# Patient Record
Sex: FEMALE | Race: WHITE | Employment: UNEMPLOYED | URBAN - METROPOLITAN AREA
[De-identification: names, ages, dates, MRNs, and addresses within clinical notes are randomized per-mention and may not be internally consistent; named-entity substitution may affect disease eponyms.]

---

## 2017-04-10 PROBLEM — E11.9 TYPE 2 DIABETES MELLITUS WITHOUT COMPLICATION, WITHOUT LONG-TERM CURRENT USE OF INSULIN (HCC): Status: ACTIVE | Noted: 2017-04-10

## 2017-04-11 PROCEDURE — 82043 UR ALBUMIN QUANTITATIVE: CPT | Performed by: FAMILY MEDICINE

## 2017-04-11 PROCEDURE — 82570 ASSAY OF URINE CREATININE: CPT | Performed by: FAMILY MEDICINE

## 2018-01-09 ENCOUNTER — LAB ENCOUNTER (OUTPATIENT)
Dept: LAB | Age: 58
End: 2018-01-09
Attending: FAMILY MEDICINE
Payer: COMMERCIAL

## 2018-01-09 ENCOUNTER — OFFICE VISIT (OUTPATIENT)
Dept: FAMILY MEDICINE CLINIC | Facility: CLINIC | Age: 58
End: 2018-01-09

## 2018-01-09 VITALS
WEIGHT: 149 LBS | HEIGHT: 66 IN | BODY MASS INDEX: 23.95 KG/M2 | SYSTOLIC BLOOD PRESSURE: 118 MMHG | OXYGEN SATURATION: 97 % | TEMPERATURE: 98 F | HEART RATE: 86 BPM | DIASTOLIC BLOOD PRESSURE: 66 MMHG

## 2018-01-09 DIAGNOSIS — E11.9 TYPE 2 DIABETES MELLITUS WITHOUT COMPLICATION, WITHOUT LONG-TERM CURRENT USE OF INSULIN (HCC): ICD-10-CM

## 2018-01-09 DIAGNOSIS — R53.83 FATIGUE, UNSPECIFIED TYPE: ICD-10-CM

## 2018-01-09 DIAGNOSIS — E03.9 HYPOTHYROIDISM, UNSPECIFIED TYPE: Primary | ICD-10-CM

## 2018-01-09 DIAGNOSIS — E03.9 HYPOTHYROIDISM, UNSPECIFIED TYPE: ICD-10-CM

## 2018-01-09 DIAGNOSIS — E55.9 VITAMIN D DEFICIENCY: ICD-10-CM

## 2018-01-09 DIAGNOSIS — Z23 FLU VACCINE NEED: ICD-10-CM

## 2018-01-09 DIAGNOSIS — E78.2 MIXED HYPERLIPIDEMIA: ICD-10-CM

## 2018-01-09 PROBLEM — R73.03 PRE-DIABETES: Status: ACTIVE | Noted: 2018-01-09

## 2018-01-09 LAB
BASOPHILS # BLD: 0.1 K/UL (ref 0–0.2)
BASOPHILS NFR BLD: 1 %
EOSINOPHIL # BLD: 0 K/UL (ref 0–0.7)
EOSINOPHIL NFR BLD: 1 %
ERYTHROCYTE [DISTWIDTH] IN BLOOD BY AUTOMATED COUNT: 12.4 % (ref 11–15)
HCT VFR BLD AUTO: 39 % (ref 35–48)
HGB BLD-MCNC: 13.4 G/DL (ref 12–16)
LYMPHOCYTES # BLD: 1.8 K/UL (ref 1–4)
LYMPHOCYTES NFR BLD: 28 %
MCH RBC QN AUTO: 31.7 PG (ref 27–32)
MCHC RBC AUTO-ENTMCNC: 34.3 G/DL (ref 32–37)
MCV RBC AUTO: 92.5 FL (ref 80–100)
MONOCYTES # BLD: 0.5 K/UL (ref 0–1)
MONOCYTES NFR BLD: 7 %
NEUTROPHILS # BLD AUTO: 4.1 K/UL (ref 1.8–7.7)
NEUTROPHILS NFR BLD: 64 %
PLATELET # BLD AUTO: 322 K/UL (ref 140–400)
PMV BLD AUTO: 9.6 FL (ref 7.4–10.3)
RBC # BLD AUTO: 4.22 M/UL (ref 3.7–5.4)
T3FREE SERPL-MCNC: 3.26 PG/ML (ref 2.53–4.29)
T4 FREE SERPL-MCNC: 1.09 NG/DL (ref 0.58–1.64)
T4 SERPL-MCNC: 9.7 MCG/DL (ref 6.1–12.2)
TSH SERPL-ACNC: 1.19 UIU/ML (ref 0.45–5.33)
WBC # BLD AUTO: 6.5 K/UL (ref 4–11)

## 2018-01-09 PROCEDURE — 85025 COMPLETE CBC W/AUTO DIFF WBC: CPT

## 2018-01-09 PROCEDURE — 99204 OFFICE O/P NEW MOD 45 MIN: CPT | Performed by: FAMILY MEDICINE

## 2018-01-09 PROCEDURE — 84481 FREE ASSAY (FT-3): CPT

## 2018-01-09 PROCEDURE — 36415 COLL VENOUS BLD VENIPUNCTURE: CPT

## 2018-01-09 PROCEDURE — 83036 HEMOGLOBIN GLYCOSYLATED A1C: CPT

## 2018-01-09 PROCEDURE — 82306 VITAMIN D 25 HYDROXY: CPT

## 2018-01-09 PROCEDURE — 84443 ASSAY THYROID STIM HORMONE: CPT

## 2018-01-09 PROCEDURE — 84439 ASSAY OF FREE THYROXINE: CPT

## 2018-01-10 LAB
25(OH)D3 SERPL-MCNC: 48.2 NG/ML
HBA1C MFR BLD: 5.8 % (ref 4–6)

## 2018-01-10 NOTE — PROGRESS NOTES
HPI:   Patient presents with:  Diabetes  Thyroid Problem  Medication Follow-Up  Fatigue      Preet Smith is a 59year old female with a history of hypothyroidism and is here for a follow up.      · Patient is currently taking levothyroxine 75 mcg on 2013: HgA1C 5.7%   • Hypothyroidism     Rediagnosed in 2013   • Murmur    • Plantar fasciitis    • PONV (postoperative nausea and vomiting)    • Subacute thyroiditis     Dx in 2009 - due to viral illness   • Vitamin D deficiency 10/1/2014    Dx in 9/2014 S2, no S3 S4  EXT: no CCE, Brachial, PT pulses WNL B UE/LE  GI: NABS, soft, nodistended,no masses, no HSM or tenderness  MS: grossly NL B UE/LE, no joint deformities, FROM B UE/LE  PSYCH: mood and affect WNL, speech clear, mood and thought congruent, no SH 1000 units once a day, will continue present management, check vitamin D levels now, follow-up pending results  - VITAMIN D, 25-HYDROXY; Future    6.  Flu vaccine need  Pt declined      The patient indicates understanding of the above recommendations and ag

## 2018-01-11 DIAGNOSIS — E11.9 TYPE 2 DIABETES MELLITUS WITHOUT COMPLICATION, WITHOUT LONG-TERM CURRENT USE OF INSULIN (HCC): ICD-10-CM

## 2018-01-11 DIAGNOSIS — E03.9 HYPOTHYROIDISM, UNSPECIFIED TYPE: Primary | ICD-10-CM

## 2018-01-12 RX ORDER — LEVOTHYROXINE SODIUM 0.1 MG/1
TABLET ORAL
Qty: 30 TABLET | Refills: 1 | Status: SHIPPED | OUTPATIENT
Start: 2018-01-12 | End: 2018-02-25

## 2018-01-24 LAB
AMB EXT CHOLESTEROL, TOTAL: 215 MG/DL
AMB EXT CHOLESTEROL, TOTAL: 215 MG/DL
AMB EXT HDL CHOLESTEROL: 62 MG/DL
AMB EXT HDL CHOLESTEROL: 62 MG/DL
AMB EXT LDL CHOLESTEROL, DIRECT: 132 MG/DL
AMB EXT LDL CHOLESTEROL, DIRECT: 132 MG/DL
AMB EXT TRIGLYCERIDES: 104 MG/DL
AMB EXT TRIGLYCERIDES: 104 MG/DL
AMB EXT VLDL: 21 MG/DL
AMB EXT VLDL: 21 MG/DL

## 2018-01-29 ENCOUNTER — TELEPHONE (OUTPATIENT)
Dept: FAMILY MEDICINE CLINIC | Facility: CLINIC | Age: 58
End: 2018-01-29

## 2018-01-29 DIAGNOSIS — E11.9 TYPE 2 DIABETES MELLITUS WITHOUT COMPLICATION, WITHOUT LONG-TERM CURRENT USE OF INSULIN (HCC): Primary | ICD-10-CM

## 2018-01-29 DIAGNOSIS — E78.2 MIXED HYPERLIPIDEMIA: ICD-10-CM

## 2018-02-26 RX ORDER — LEVOTHYROXINE SODIUM 0.1 MG/1
TABLET ORAL
Qty: 30 TABLET | Refills: 1 | Status: SHIPPED | OUTPATIENT
Start: 2018-02-26 | End: 2018-03-21

## 2018-03-02 ENCOUNTER — TELEPHONE (OUTPATIENT)
Dept: FAMILY MEDICINE CLINIC | Facility: CLINIC | Age: 58
End: 2018-03-02

## 2018-03-02 NOTE — TELEPHONE ENCOUNTER
Pt called very upset about her hospital bill. She wants  to know that she is getting two bills for the same radiologists Janet Turner for her blood test here on 1st floor.   Also she said the [facility] send results to us on Jan 24 2017 and she

## 2018-03-21 ENCOUNTER — TELEPHONE (OUTPATIENT)
Dept: FAMILY MEDICINE CLINIC | Facility: CLINIC | Age: 58
End: 2018-03-21

## 2018-03-21 DIAGNOSIS — E11.9 TYPE 2 DIABETES MELLITUS WITHOUT COMPLICATION, WITHOUT LONG-TERM CURRENT USE OF INSULIN (HCC): ICD-10-CM

## 2018-03-21 RX ORDER — LEVOTHYROXINE SODIUM 0.1 MG/1
100 TABLET ORAL
Qty: 90 TABLET | Refills: 0 | Status: SHIPPED | OUTPATIENT
Start: 2018-03-21 | End: 2018-06-20

## 2018-04-05 ENCOUNTER — TELEPHONE (OUTPATIENT)
Dept: FAMILY MEDICINE CLINIC | Facility: CLINIC | Age: 58
End: 2018-04-05

## 2018-04-05 DIAGNOSIS — R93.1 ABNORMAL HEART SCORE CT: Primary | ICD-10-CM

## 2018-04-05 NOTE — TELEPHONE ENCOUNTER
Dr jha a CT HEART CALCIUM SCORING order by Dr. Corby Cesar and he's referring pt to see a cardiologist for further evaluation. Do you want to see this pt for appt? Please advice.

## 2018-04-05 NOTE — TELEPHONE ENCOUNTER
Pt was concerned heart ct scan she saw on my chart wanted to talk to Dr. Abdul Cashing her she is not hear today,she would like a cb from nurse.

## 2018-04-10 NOTE — TELEPHONE ENCOUNTER
Per Dr. Frances Montes De Oca:  Call pt-her Ca+ score places her at mod risk for heart dis-agree w Cardiology evaluation, but no need to be worried. .. (Routing comment)     Spoke to pt, informed her of CT heart calcium scoring results.  Pt states she did see the re

## 2018-04-12 ENCOUNTER — CHARTING TRANS (OUTPATIENT)
Dept: OTHER | Age: 58
End: 2018-04-12

## 2018-04-12 ENCOUNTER — TELEPHONE (OUTPATIENT)
Dept: FAMILY MEDICINE CLINIC | Facility: CLINIC | Age: 58
End: 2018-04-12

## 2018-04-12 PROBLEM — R93.1 ABNORMAL HEART SCORE CT: Status: ACTIVE | Noted: 2018-04-12

## 2018-04-12 NOTE — TELEPHONE ENCOUNTER
Shirley Duffy from Dr. Gwen Hubbard office called for records for pt. [id number] [facility] nurse faxed Cardiac Calcium Score progress notes and blood results to office at [phone]. Pt had an appt today.

## 2018-04-20 ENCOUNTER — CHARTING TRANS (OUTPATIENT)
Dept: OTHER | Age: 58
End: 2018-04-20

## 2018-04-20 ENCOUNTER — DIAGNOSTIC TRANS (OUTPATIENT)
Dept: OTHER | Age: 58
End: 2018-04-20

## 2018-06-19 DIAGNOSIS — E03.8 OTHER SPECIFIED HYPOTHYROIDISM: Primary | ICD-10-CM

## 2018-06-19 DIAGNOSIS — E11.9 TYPE 2 DIABETES MELLITUS WITHOUT COMPLICATION, WITHOUT LONG-TERM CURRENT USE OF INSULIN (HCC): ICD-10-CM

## 2018-06-19 NOTE — TELEPHONE ENCOUNTER
A refill request was received for:    Pending Prescriptions Disp Refills    METFORMIN  MG Oral Tab [Pharmacy Med Name: METFORMIN  MG TABLET] 180 tablet 0     Sig: TAKE 1 TABLET BY MOUTH RIGHT AFTER BREAKFAST AND DINNER         Last refill jackie

## 2018-06-20 RX ORDER — LEVOTHYROXINE SODIUM 0.1 MG/1
100 TABLET ORAL
Qty: 90 TABLET | Refills: 0 | Status: SHIPPED | OUTPATIENT
Start: 2018-06-20 | End: 2018-08-24

## 2018-06-21 NOTE — TELEPHONE ENCOUNTER
Pt would like to know if she needs labs prior to 7/31/18 appt. Last labs (CMP/Lipids) 1/24/18; scanned in. Full labs including HgbA1c, TFT, CBC, Vit D completed 1/9/18.     RN/MA: Please print lab orders, pt requests to have them completed elsewhere due

## 2018-06-21 NOTE — TELEPHONE ENCOUNTER
Cld pt made appt for 7/31. Please refill metaformin. She wants to know if doc wants blood test run again before she sees her, they were run in Jan. Also she saw a cardiologist out of [facility] who started her on a statin a month ago.   If blood work needed

## 2018-07-03 ENCOUNTER — TELEPHONE (OUTPATIENT)
Dept: SURGERY | Facility: CLINIC | Age: 58
End: 2018-07-03

## 2018-07-03 NOTE — TELEPHONE ENCOUNTER
Pt phoned office wanting to speak to Dr. Rubina Bah regarding test result. States Radiologist documented 'appendix' in her chart, and pt states it was removed. Dr. Rubina Bah aware, and instructed pt that he was going to review the imaging.  Dr. Rubina Bah notified of

## 2018-08-01 LAB
ALBUMIN/GLOBULIN RATIO: 1.4 (CALC) (ref 1–2.5)
ALBUMIN: 4.3 G/DL (ref 3.6–5.1)
ALKALINE PHOSPHATASE: 69 U/L (ref 33–130)
ALT: 15 U/L (ref 6–29)
AMB EXT CHOLESTEROL, TOTAL: 130 MG/DL
AMB EXT HDL CHOLESTEROL: 61 MG/DL
AMB EXT LDL CHOLESTEROL, DIRECT: 52 MG/DL
AMB EXT TRIGLYCERIDES: 84 MG/DL
AMB EXT VLDL: 17 MG/DL
AST: 21 U/L (ref 10–35)
BILIRUBIN, TOTAL: 0.6 MG/DL (ref 0.2–1.2)
BUN: 14 MG/DL (ref 7–25)
CALCIUM: 9.4 MG/DL (ref 8.6–10.4)
CARBON DIOXIDE: 28 MMOL/L (ref 20–31)
CHLORIDE: 105 MMOL/L (ref 98–110)
CREATININE: 0.79 MG/DL (ref 0.5–1.05)
EGFR IF AFRICN AM: 95 ML/MIN/1.73M2
EGFR IF NONAFRICN AM: 82 ML/MIN/1.73M2
GLOBULIN: 3 G/DL (CALC) (ref 1.9–3.7)
GLUCOSE: 100 MG/DL (ref 65–99)
HEMOGLOBIN A1C: 5.6 % OF TOTAL HGB
POTASSIUM: 4.3 MMOL/L (ref 3.5–5.3)
PROTEIN, TOTAL: 7.3 G/DL (ref 6.1–8.1)
SODIUM: 140 MMOL/L (ref 135–146)
T4 (THYROXINE), TOTAL: 10.5 MCG/DL (ref 4.5–12)
T4, FREE: 1.6 NG/DL (ref 0.8–1.8)
TSH: 0.21 MIU/L (ref 0.4–4.5)

## 2018-08-24 ENCOUNTER — OFFICE VISIT (OUTPATIENT)
Dept: FAMILY MEDICINE CLINIC | Facility: CLINIC | Age: 58
End: 2018-08-24
Payer: COMMERCIAL

## 2018-08-24 VITALS
WEIGHT: 152 LBS | RESPIRATION RATE: 16 BRPM | HEIGHT: 66 IN | OXYGEN SATURATION: 98 % | HEART RATE: 78 BPM | BODY MASS INDEX: 24.43 KG/M2 | SYSTOLIC BLOOD PRESSURE: 122 MMHG | DIASTOLIC BLOOD PRESSURE: 72 MMHG

## 2018-08-24 DIAGNOSIS — E78.00 PURE HYPERCHOLESTEROLEMIA: ICD-10-CM

## 2018-08-24 DIAGNOSIS — Z12.39 SCREENING FOR BREAST CANCER: ICD-10-CM

## 2018-08-24 DIAGNOSIS — I25.84 CORONARY ARTERY CALCIFICATION: ICD-10-CM

## 2018-08-24 DIAGNOSIS — I25.10 CORONARY ARTERY CALCIFICATION: ICD-10-CM

## 2018-08-24 DIAGNOSIS — R73.03 PRE-DIABETES: ICD-10-CM

## 2018-08-24 DIAGNOSIS — E03.8 OTHER SPECIFIED HYPOTHYROIDISM: Primary | ICD-10-CM

## 2018-08-24 PROBLEM — E11.9 TYPE 2 DIABETES MELLITUS WITHOUT COMPLICATION, WITHOUT LONG-TERM CURRENT USE OF INSULIN (HCC): Status: RESOLVED | Noted: 2017-04-10 | Resolved: 2018-08-24

## 2018-08-24 PROCEDURE — 99214 OFFICE O/P EST MOD 30 MIN: CPT | Performed by: FAMILY MEDICINE

## 2018-08-24 RX ORDER — LEVOTHYROXINE SODIUM 0.1 MG/1
100 TABLET ORAL
Qty: 90 TABLET | Refills: 0 | Status: SHIPPED | OUTPATIENT
Start: 2018-08-24 | End: 2018-12-16

## 2018-08-24 RX ORDER — ROSUVASTATIN CALCIUM 5 MG/1
5 TABLET, COATED ORAL DAILY
Qty: 90 TABLET | Refills: 0 | Status: SHIPPED | OUTPATIENT
Start: 2018-08-24 | End: 2018-11-22

## 2018-08-24 NOTE — PROGRESS NOTES
HPI:   Patient presents with:  Diabetes: pre-diabetes  Thyroid Problem  Hyperlipidemia      Claire Chan is a 59year old female with a history of hypothyroidism and is here for a follow up. Patient is currently taking: see below.   Pt has been t tablet Rfl: 0   Levothyroxine Sodium 100 MCG Oral Tab Take 1 tablet (100 mcg total) by mouth once daily. Disp: 90 tablet Rfl: 0   Cholecalciferol (VITAMIN D) 1000 UNITS Oral Tab Take 1 tablet by mouth daily.  Disp:  Rfl:    Multiple Vitamins-Minerals (MULTI lesions, skin color wnl  HEENT: atraumatic, normocephalic, nose and throat are clear;dentition good, EARS: canals clear, TM wnl B  EYES:PERRLA, EOMI,conjunctiva are clear, no discharge; no nystagmus  NECK: supple, no lymphadenopathy, THYROID: no TM, no mas months  We discussed the risks and benefits of her medication. The patient (and/or her parent) aware of the risks of taking the above medication and indicates understanding of these risks and agrees to the above plan.   - MetFORMIN HCl 500 MG Oral Tab; TAKE

## 2018-10-09 ENCOUNTER — OFFICE VISIT (OUTPATIENT)
Dept: SURGERY | Facility: CLINIC | Age: 58
End: 2018-10-09
Payer: COMMERCIAL

## 2018-10-09 VITALS
SYSTOLIC BLOOD PRESSURE: 132 MMHG | WEIGHT: 149 LBS | HEART RATE: 80 BPM | TEMPERATURE: 98 F | HEIGHT: 66 IN | BODY MASS INDEX: 23.95 KG/M2 | DIASTOLIC BLOOD PRESSURE: 77 MMHG

## 2018-10-09 DIAGNOSIS — Z83.71 FAMILY HISTORY OF POLYPS IN THE COLON: Primary | ICD-10-CM

## 2018-10-09 DIAGNOSIS — Z83.71 FAMILY HISTORY OF COLONIC POLYPS: ICD-10-CM

## 2018-10-09 DIAGNOSIS — E03.8 OTHER SPECIFIED HYPOTHYROIDISM: ICD-10-CM

## 2018-10-09 PROBLEM — Z83.719 FAMILY HISTORY OF COLONIC POLYPS: Status: ACTIVE | Noted: 2018-10-09

## 2018-10-09 PROCEDURE — 99203 OFFICE O/P NEW LOW 30 MIN: CPT | Performed by: SURGERY

## 2018-10-09 RX ORDER — POLYETHYLENE GLYCOL 3350, SODIUM CHLORIDE, SODIUM BICARBONATE, POTASSIUM CHLORIDE 420; 11.2; 5.72; 1.48 G/4L; G/4L; G/4L; G/4L
POWDER, FOR SOLUTION ORAL
Qty: 1 BOTTLE | Refills: 0 | Status: SHIPPED | OUTPATIENT
Start: 2018-10-09 | End: 2018-11-19 | Stop reason: ALTCHOICE

## 2018-10-09 RX ORDER — POLYETHYLENE GLYCOL 3350, SODIUM CHLORIDE, SODIUM BICARBONATE, POTASSIUM CHLORIDE 420; 11.2; 5.72; 1.48 G/4L; G/4L; G/4L; G/4L
POWDER, FOR SOLUTION ORAL
Qty: 1 BOTTLE | Refills: 0 | Status: CANCELLED | OUTPATIENT
Start: 2018-10-09

## 2018-10-09 NOTE — PROGRESS NOTES
Follow Up Visit Note       Active Problems      1. Family history of polyps in the colon    2. Family history of colonic polyps    3.  Other specified hypothyroidism          Chief Complaint   Patient presents with:  Colonoscopy: last csope 12/12/13 with RR Marital status:       Spouse name: Not on file      Number of children: 2      Years of education: Not on file      Highest education level: Not on file    Social Needs      Financial resource strain: Not on file      Food insecurity - worry: Not on been reviewed by me during today. Review of Systems   Constitutional: Negative for chills, diaphoresis, fatigue, fever and unexpected weight change. HENT: Negative for hearing loss, nosebleeds, sore throat and trouble swallowing.     Respiratory: Negativ benefits, alternatives. · Risks include but are not exclusive to infection, bleeding, perforation, and missed lesion. · Bowel prep discussed with the patient and printed instructions provided. · All questions answered.       I spent 30 minutes face to fa

## 2018-11-01 VITALS
DIASTOLIC BLOOD PRESSURE: 78 MMHG | SYSTOLIC BLOOD PRESSURE: 122 MMHG | OXYGEN SATURATION: 99 % | HEIGHT: 66 IN | HEART RATE: 88 BPM | BODY MASS INDEX: 23.63 KG/M2 | WEIGHT: 147 LBS | RESPIRATION RATE: 16 BRPM

## 2018-11-01 VITALS — SYSTOLIC BLOOD PRESSURE: 122 MMHG | DIASTOLIC BLOOD PRESSURE: 64 MMHG | HEART RATE: 77 BPM

## 2018-11-15 ENCOUNTER — TELEPHONE (OUTPATIENT)
Dept: SURGERY | Facility: CLINIC | Age: 58
End: 2018-11-15

## 2018-11-22 DIAGNOSIS — E78.00 PURE HYPERCHOLESTEROLEMIA: ICD-10-CM

## 2018-11-25 RX ORDER — ROSUVASTATIN CALCIUM 5 MG/1
TABLET, COATED ORAL
Qty: 90 TABLET | Refills: 0 | Status: SHIPPED | OUTPATIENT
Start: 2018-11-25 | End: 2019-02-22

## 2018-11-27 ENCOUNTER — ANESTHESIA EVENT (OUTPATIENT)
Dept: ENDOSCOPY | Facility: HOSPITAL | Age: 58
End: 2018-11-27

## 2018-11-27 NOTE — H&P
History of Present Illness     Pt here for a colonoscopy. Last one in 12/2013 - normal.  Pt's sister with a large amount of colon polyps by colonoscopy. Pt's brother had multiple polyps as well as needed surgery to remove a large polyp (colon resection). Financial resource strain: Not on file      Food insecurity - worry: Not on file      Food insecurity - inability: Not on file      Transportation needs - medical: Not on file      Transportation needs - non-medical: Not on file    Occupational History hearing loss, nosebleeds, sore throat and trouble swallowing. Respiratory: Negative for apnea, cough, shortness of breath and wheezing. Cardiovascular: Negative for chest pain, palpitations and leg swelling.    Gastrointestinal: Negative for abdominal instructions provided.   · All questions answered.            Pre-op Diagnosis: Family history of polyps in the colon [Z83.71]    The above referenced H&P was reviewed by Jacek Lake MD on 11/27/2018, the patient was examined and no significant changes h

## 2018-11-28 ENCOUNTER — HOSPITAL ENCOUNTER (OUTPATIENT)
Facility: HOSPITAL | Age: 58
Setting detail: HOSPITAL OUTPATIENT SURGERY
Discharge: HOME | End: 2018-11-28
Attending: SURGERY | Admitting: SURGERY
Payer: COMMERCIAL

## 2018-11-28 ENCOUNTER — ANESTHESIA (OUTPATIENT)
Dept: ENDOSCOPY | Facility: HOSPITAL | Age: 58
End: 2018-11-28

## 2018-11-28 VITALS
TEMPERATURE: 98 F | DIASTOLIC BLOOD PRESSURE: 84 MMHG | SYSTOLIC BLOOD PRESSURE: 132 MMHG | BODY MASS INDEX: 23.78 KG/M2 | RESPIRATION RATE: 16 BRPM | HEART RATE: 64 BPM | WEIGHT: 148 LBS | HEIGHT: 66 IN | OXYGEN SATURATION: 99 %

## 2018-11-28 DIAGNOSIS — Z83.71 FAMILY HISTORY OF POLYPS IN THE COLON: ICD-10-CM

## 2018-11-28 PROBLEM — K64.8 INTERNAL HEMORRHOIDS WITHOUT COMPLICATION: Status: ACTIVE | Noted: 2018-11-28

## 2018-11-28 PROCEDURE — 0DJD8ZZ INSPECTION OF LOWER INTESTINAL TRACT, VIA NATURAL OR ARTIFICIAL OPENING ENDOSCOPIC: ICD-10-PCS | Performed by: SURGERY

## 2018-11-28 PROCEDURE — 82962 GLUCOSE BLOOD TEST: CPT

## 2018-11-28 RX ORDER — DEXTROSE MONOHYDRATE 25 G/50ML
50 INJECTION, SOLUTION INTRAVENOUS
Status: DISCONTINUED | OUTPATIENT
Start: 2018-11-28 | End: 2018-11-28

## 2018-11-28 RX ORDER — SODIUM CHLORIDE, SODIUM LACTATE, POTASSIUM CHLORIDE, CALCIUM CHLORIDE 600; 310; 30; 20 MG/100ML; MG/100ML; MG/100ML; MG/100ML
INJECTION, SOLUTION INTRAVENOUS CONTINUOUS
Status: DISCONTINUED | OUTPATIENT
Start: 2018-11-28 | End: 2018-11-28

## 2018-11-28 NOTE — BRIEF OP NOTE
Pre-Operative Diagnosis: Family history of polyps in the colon [Z83.71]     Post-Operative Diagnosis: HEMORRHOIDS      Procedure Performed:   Procedure(s):  COLONOSCOPY    Surgeon(s) and Role:     Selina Godoy MD - Primary    Assistant(s):        Surg

## 2018-11-28 NOTE — OPERATIVE REPORT
[facility]    PATIENT'S NAME: Ross Burn [facility]   ATTENDING PHYSICIAN: Catalino Connor M.D. OPERATING PHYSICIAN: Catalino Connor M.D.    PATIENT ACCOUNT#:   [de-identified]    LOCATION:  [facility] ROOMS 8 EDWP  MEDICAL RECORD #:   [id number] removed. The patient tolerated the procedure well and was taken to Recovery in stable condition. The patient will need a repeat colonoscopy in 5 years given her significant family history of colon polyps.   The patient will follow up in 1 to 2 weeks t

## 2018-11-28 NOTE — ANESTHESIA POSTPROCEDURE EVALUATION
[id number] [facility] Patient Status:  Hospital Outpatient Surgery   Age/Gender 59year old female MRN [id number]   Location [facility]. Attending Landen Park MD   Hosp Day # 0 PCP MD Delmis Adams

## 2018-11-28 NOTE — ANESTHESIA PREPROCEDURE EVALUATION
PRE-OP EVALUATION    Patient Name: Geeta Boo    Pre-op Diagnosis: Family history of polyps in the colon [Z83.71]    Procedure(s):  COLONOSCOPY    Surgeon(s) and Role:     Venice Stevens MD - Primary    Pre-op vitals reviewed.   Temp: 97.7 °F (3 from right Achilles/debridement   • OTHER SURGICAL HISTORY      Kennan teeth surgery     Social History    Tobacco Use      Smoking status: Never Smoker      Smokeless tobacco: Never Used    Alcohol use: Yes      Comment: SOCIAL      Drug use: No     Avail

## 2018-12-16 DIAGNOSIS — E03.8 OTHER SPECIFIED HYPOTHYROIDISM: ICD-10-CM

## 2018-12-16 DIAGNOSIS — R73.03 PRE-DIABETES: ICD-10-CM

## 2018-12-17 RX ORDER — LEVOTHYROXINE SODIUM 0.1 MG/1
TABLET ORAL
Qty: 90 TABLET | Refills: 0 | Status: SHIPPED | OUTPATIENT
Start: 2018-12-17 | End: 2019-03-16

## 2018-12-17 NOTE — TELEPHONE ENCOUNTER
A refill request was received for:  Requested Prescriptions     Pending Prescriptions Disp Refills   • LEVOTHYROXINE SODIUM 100 MCG Oral Tab [Pharmacy Med Name: LEVOTHYROXINE 100 MCG TABLET] 90 tablet 0     Sig: TAKE 1 TABLET BY MOUTH ONCE DAILY.    • LLOYD

## 2019-02-02 LAB
ABSOLUTE BASOPHILS: 21 CELLS/UL (ref 0–200)
ABSOLUTE EOSINOPHILS: 57 CELLS/UL (ref 15–500)
ABSOLUTE LYMPHOCYTES: 1624 CELLS/UL (ref 850–3900)
ABSOLUTE MONOCYTES: 414 CELLS/UL (ref 200–950)
ABSOLUTE NEUTROPHILS: 1984 CELLS/UL (ref 1500–7800)
BASOPHILS: 0.5 %
CHOL/HDLC RATIO: 2 (CALC)
CHOLESTEROL, TOTAL: 129 MG/DL
EOSINOPHILS: 1.4 %
HDL CHOLESTEROL: 66 MG/DL
HEMATOCRIT: 39.3 % (ref 35–45)
HEMOGLOBIN A1C: 5.6 % OF TOTAL HGB
HEMOGLOBIN: 13.2 G/DL (ref 11.7–15.5)
LDL-CHOLESTEROL: 49 MG/DL (CALC)
LYMPHOCYTES: 39.6 %
MCH: 30.4 PG (ref 27–33)
MCHC: 33.6 G/DL (ref 32–36)
MCV: 90.6 FL (ref 80–100)
MONOCYTES: 10.1 %
MPV: 11.7 FL (ref 7.5–12.5)
NEUTROPHILS: 48.4 %
NON-HDL CHOLESTEROL: 63 MG/DL (CALC)
PLATELET COUNT: 272 THOUSAND/UL (ref 140–400)
RDW: 11.6 % (ref 11–15)
RED BLOOD CELL COUNT: 4.34 MILLION/UL (ref 3.8–5.1)
T4 (THYROXINE), TOTAL: 12.1 MCG/DL (ref 5.1–11.9)
T4, FREE: 1.8 NG/DL (ref 0.8–1.8)
TRIGLYCERIDES: 68 MG/DL
TSH: 0.18 MIU/L (ref 0.4–4.5)
WHITE BLOOD CELL COUNT: 4.1 THOUSAND/UL (ref 3.8–10.8)

## 2019-02-12 ENCOUNTER — OFFICE VISIT (OUTPATIENT)
Dept: FAMILY MEDICINE CLINIC | Facility: CLINIC | Age: 59
End: 2019-02-12
Payer: COMMERCIAL

## 2019-02-12 VITALS
WEIGHT: 153 LBS | HEIGHT: 66 IN | HEART RATE: 86 BPM | DIASTOLIC BLOOD PRESSURE: 70 MMHG | OXYGEN SATURATION: 98 % | SYSTOLIC BLOOD PRESSURE: 110 MMHG | RESPIRATION RATE: 17 BRPM | BODY MASS INDEX: 24.59 KG/M2

## 2019-02-12 DIAGNOSIS — E03.8 OTHER SPECIFIED HYPOTHYROIDISM: Primary | ICD-10-CM

## 2019-02-12 DIAGNOSIS — R73.03 PRE-DIABETES: ICD-10-CM

## 2019-02-12 DIAGNOSIS — Z00.00 ROUTINE MEDICAL EXAM: ICD-10-CM

## 2019-02-12 DIAGNOSIS — E78.00 PURE HYPERCHOLESTEROLEMIA: ICD-10-CM

## 2019-02-12 PROBLEM — K64.8 INTERNAL HEMORRHOIDS WITHOUT COMPLICATION: Status: RESOLVED | Noted: 2018-11-28 | Resolved: 2019-02-12

## 2019-02-12 LAB
CREAT UR-SCNC: 31.2 MG/DL
MICROALBUMIN UR-MCNC: 0.57 MG/DL
MICROALBUMIN/CREAT 24H UR-RTO: 18.3 UG/MG (ref ?–30)

## 2019-02-12 PROCEDURE — 82570 ASSAY OF URINE CREATININE: CPT | Performed by: FAMILY MEDICINE

## 2019-02-12 PROCEDURE — 82043 UR ALBUMIN QUANTITATIVE: CPT | Performed by: FAMILY MEDICINE

## 2019-02-12 PROCEDURE — 90471 IMMUNIZATION ADMIN: CPT | Performed by: FAMILY MEDICINE

## 2019-02-12 PROCEDURE — 99214 OFFICE O/P EST MOD 30 MIN: CPT | Performed by: FAMILY MEDICINE

## 2019-02-12 PROCEDURE — 90715 TDAP VACCINE 7 YRS/> IM: CPT | Performed by: FAMILY MEDICINE

## 2019-02-12 NOTE — PROGRESS NOTES
HPI:   Patient presents with:  Hyperlipidemia  Thyroid Problem  Pre-diabetes      Albaro Mayer is a 59year old female with a history of hypothyroidism and is here for a follow up. Patient is currently taking: see below.   Pt has been taking me A1c     Dx in 2013: HgA1C 5.7%   • High cholesterol    • Hypothyroidism     Rediagnosed in 2013   • Murmur    • Plantar fasciitis    • PONV (postoperative nausea and vomiting)    • Subacute thyroiditis     Dx in 2009 - due to viral illness   • Visual impai no S3 S4  EXT: no CCE, Brachial, PT pulses WNL B UE/LE  GI: NABS, soft, nodistended,no masses, no HSM or tenderness  MS: grossly NL B UE/LE, no joint deformities, FROM B UE/LE  PSYCH: mood and affect WNL, speech clear, mood and thought congruent, no SHIP recommendations and agrees to the above plan.   Follow up: as above    Orders Placed This Encounter      Microalb/Creat Ratio, Random Urine      CMP      LIPID PANEL      HgbA1c (Glycohemoglobin)      TSH      T4 free      T4 Total      Tdap      Meds & Ref

## 2019-02-22 DIAGNOSIS — E78.00 PURE HYPERCHOLESTEROLEMIA: ICD-10-CM

## 2019-02-22 RX ORDER — ROSUVASTATIN CALCIUM 5 MG/1
TABLET, COATED ORAL
Qty: 90 TABLET | Refills: 0 | Status: SHIPPED | OUTPATIENT
Start: 2019-02-22 | End: 2019-05-24

## 2019-03-16 DIAGNOSIS — E03.8 OTHER SPECIFIED HYPOTHYROIDISM: ICD-10-CM

## 2019-03-16 DIAGNOSIS — R73.03 PRE-DIABETES: ICD-10-CM

## 2019-03-19 RX ORDER — LEVOTHYROXINE SODIUM 0.1 MG/1
TABLET ORAL
Qty: 90 TABLET | Refills: 0 | Status: SHIPPED | OUTPATIENT
Start: 2019-03-19 | End: 2019-06-16

## 2019-04-16 ENCOUNTER — OFFICE VISIT (OUTPATIENT)
Dept: FAMILY MEDICINE CLINIC | Facility: CLINIC | Age: 59
End: 2019-04-16

## 2019-04-16 VITALS
HEIGHT: 66 IN | SYSTOLIC BLOOD PRESSURE: 122 MMHG | DIASTOLIC BLOOD PRESSURE: 62 MMHG | WEIGHT: 150 LBS | BODY MASS INDEX: 24.11 KG/M2

## 2019-04-16 DIAGNOSIS — R23.8 INTRINSIC AGING OF FACIAL SKIN: Primary | ICD-10-CM

## 2019-04-16 DIAGNOSIS — M79.10 MYALGIA: ICD-10-CM

## 2019-04-16 NOTE — PROCEDURES
HPI:   Patient presents with:  Derm Problem: Botox #1       Brad Emerson is a 59year old female who presents for cosmetic facial BOTOX injections for normal skin aging effects. She is without other complaints and concerns x for below.   See EDWIN dill weakness  PSYCH:  No mood concerns    EXAM:   /62 (BP Location: Right arm)   Ht 66\"   Wt 150 lb   LMP 07/15/2012   BMI 24.21 kg/m²  Estimated body mass index is 24.21 kg/m² as calculated from the following:    Height as of this encounter: 66\".     Derick Meth requested or ordered in this encounter       Imaging & Consults:  None    Elen Turner MD

## 2019-05-04 ENCOUNTER — TELEPHONE (OUTPATIENT)
Dept: FAMILY MEDICINE CLINIC | Facility: CLINIC | Age: 59
End: 2019-05-04

## 2019-05-05 NOTE — TELEPHONE ENCOUNTER
Rcvd pt's results for her additional mammographic views of L breast and subsequent L breast US. HM updated to indicate completion date. Report placed on provider's desk for review. Send to scan when complete.

## 2019-05-17 ENCOUNTER — TELEPHONE (OUTPATIENT)
Dept: FAMILY MEDICINE CLINIC | Facility: CLINIC | Age: 59
End: 2019-05-17

## 2019-05-17 NOTE — TELEPHONE ENCOUNTER
Rcvd screening mammogram results as well as results for pt's additional views and US of L breast. Reports placed on provider's desk. Send to scan when complete. HM updated to indicate results of additional views.

## 2019-05-24 DIAGNOSIS — E78.00 PURE HYPERCHOLESTEROLEMIA: ICD-10-CM

## 2019-05-24 RX ORDER — ROSUVASTATIN CALCIUM 5 MG/1
TABLET, COATED ORAL
Qty: 90 TABLET | Refills: 0 | Status: SHIPPED | OUTPATIENT
Start: 2019-05-24 | End: 2019-10-18

## 2019-06-16 DIAGNOSIS — E03.8 OTHER SPECIFIED HYPOTHYROIDISM: ICD-10-CM

## 2019-06-16 DIAGNOSIS — R73.03 PRE-DIABETES: ICD-10-CM

## 2019-06-17 NOTE — TELEPHONE ENCOUNTER
A refill request was received for:  Requested Prescriptions     Pending Prescriptions Disp Refills   • METFORMIN  MG Oral Tab [Pharmacy Med Name: METFORMIN  MG TABLET] 180 tablet 0     Sig: TAKE 1 TABLET BY MOUTH RIGHT AFTER BREAKFAST AND DIN

## 2019-06-18 RX ORDER — LEVOTHYROXINE SODIUM 0.1 MG/1
TABLET ORAL
Qty: 90 TABLET | Refills: 0 | Status: SHIPPED | OUTPATIENT
Start: 2019-06-18 | End: 2019-09-16

## 2019-06-18 NOTE — TELEPHONE ENCOUNTER
Call pt-LHK I sent in RF(s) and remind that she is due for labs and follow up with me: In August    (follow up should be seen Q6 months)  Thanks!

## 2019-08-19 NOTE — TELEPHONE ENCOUNTER
Pt cld to make appt for physical w/pap. She can only come in October because [place]. She also made appt for botox after physical based on Dr. Carlos Enamorado message to her.

## 2019-09-13 LAB
ALBUMIN/GLOBULIN RATIO: 1.5 (CALC) (ref 1–2.5)
ALBUMIN: 4.3 G/DL (ref 3.6–5.1)
ALKALINE PHOSPHATASE: 67 U/L (ref 33–130)
ALT: 14 U/L (ref 6–29)
AST: 21 U/L (ref 10–35)
BILIRUBIN, TOTAL: 0.5 MG/DL (ref 0.2–1.2)
BUN: 19 MG/DL (ref 7–25)
CALCIUM: 9.3 MG/DL (ref 8.6–10.4)
CARBON DIOXIDE: 28 MMOL/L (ref 20–32)
CHLORIDE: 103 MMOL/L (ref 98–110)
CHOL/HDLC RATIO: 2.6 (CALC)
CHOLESTEROL, TOTAL: 160 MG/DL
CREATININE: 0.87 MG/DL (ref 0.5–0.99)
EGFR IF AFRICN AM: 83 ML/MIN/1.73M2
EGFR IF NONAFRICN AM: 72 ML/MIN/1.73M2
GLOBULIN: 2.9 G/DL (CALC) (ref 1.9–3.7)
GLUCOSE: 105 MG/DL (ref 65–99)
HDL CHOLESTEROL: 61 MG/DL
HEMOGLOBIN A1C: 5.7 % OF TOTAL HGB
LDL-CHOLESTEROL: 83 MG/DL (CALC)
NON-HDL CHOLESTEROL: 99 MG/DL (CALC)
POTASSIUM: 4.1 MMOL/L (ref 3.5–5.3)
PROTEIN, TOTAL: 7.2 G/DL (ref 6.1–8.1)
SODIUM: 139 MMOL/L (ref 135–146)
T4 (THYROXINE), TOTAL: 8.6 MCG/DL (ref 5.1–11.9)
T4, FREE: 1.3 NG/DL (ref 0.8–1.8)
TRIGLYCERIDES: 80 MG/DL
TSH: 0.62 MIU/L (ref 0.4–4.5)

## 2019-09-16 DIAGNOSIS — E03.8 OTHER SPECIFIED HYPOTHYROIDISM: ICD-10-CM

## 2019-09-16 DIAGNOSIS — R73.03 PRE-DIABETES: ICD-10-CM

## 2019-09-17 DIAGNOSIS — R73.03 PRE-DIABETES: ICD-10-CM

## 2019-09-17 DIAGNOSIS — E03.8 OTHER SPECIFIED HYPOTHYROIDISM: ICD-10-CM

## 2019-09-17 RX ORDER — LEVOTHYROXINE SODIUM 0.1 MG/1
100 TABLET ORAL
Qty: 90 TABLET | Refills: 0 | OUTPATIENT
Start: 2019-09-17

## 2019-09-17 RX ORDER — LEVOTHYROXINE SODIUM 0.1 MG/1
TABLET ORAL
Qty: 90 TABLET | Refills: 0 | Status: SHIPPED | OUTPATIENT
Start: 2019-09-17 | End: 2019-11-21

## 2019-09-17 NOTE — TELEPHONE ENCOUNTER
Pt called stating she will be traveling from 9/19/19 to 10/10/19; requesting 30 day refill for Metformin and Levothyroxine. Medications pended.

## 2019-09-17 NOTE — TELEPHONE ENCOUNTER
Rx's pass protocol - rx's refilled.   Thyroid; TSH in past 15 m, last TSH btw 0.350-5.500IU, apt in past 6 m  DM; A1C in past 6 m, last a1c < 7.7, microalb in past 12 m

## 2019-10-18 ENCOUNTER — OFFICE VISIT (OUTPATIENT)
Dept: FAMILY MEDICINE CLINIC | Facility: CLINIC | Age: 59
End: 2019-10-18
Payer: COMMERCIAL

## 2019-10-18 ENCOUNTER — OFFICE VISIT (OUTPATIENT)
Dept: FAMILY MEDICINE CLINIC | Facility: CLINIC | Age: 59
End: 2019-10-18

## 2019-10-18 ENCOUNTER — TELEPHONE (OUTPATIENT)
Dept: FAMILY MEDICINE CLINIC | Facility: CLINIC | Age: 59
End: 2019-10-18

## 2019-10-18 VITALS
HEART RATE: 84 BPM | SYSTOLIC BLOOD PRESSURE: 114 MMHG | TEMPERATURE: 98 F | OXYGEN SATURATION: 99 % | WEIGHT: 158 LBS | DIASTOLIC BLOOD PRESSURE: 80 MMHG | HEIGHT: 66 IN | BODY MASS INDEX: 25.39 KG/M2 | RESPIRATION RATE: 16 BRPM

## 2019-10-18 DIAGNOSIS — I25.84 CORONARY ARTERY CALCIFICATION: ICD-10-CM

## 2019-10-18 DIAGNOSIS — I25.10 CORONARY ARTERY CALCIFICATION: ICD-10-CM

## 2019-10-18 DIAGNOSIS — Z00.00 ROUTINE MEDICAL EXAM: Primary | ICD-10-CM

## 2019-10-18 DIAGNOSIS — R82.998 SWEET URINE ODOR: ICD-10-CM

## 2019-10-18 DIAGNOSIS — R73.03 PRE-DIABETES: ICD-10-CM

## 2019-10-18 DIAGNOSIS — Z12.4 SCREENING FOR MALIGNANT NEOPLASM OF CERVIX: ICD-10-CM

## 2019-10-18 DIAGNOSIS — R23.8 INTRINSIC AGING OF FACIAL SKIN: Primary | ICD-10-CM

## 2019-10-18 DIAGNOSIS — E03.8 OTHER SPECIFIED HYPOTHYROIDISM: ICD-10-CM

## 2019-10-18 DIAGNOSIS — E78.00 PURE HYPERCHOLESTEROLEMIA: ICD-10-CM

## 2019-10-18 DIAGNOSIS — Z13.820 SCREENING FOR OSTEOPOROSIS: ICD-10-CM

## 2019-10-18 PROCEDURE — 99213 OFFICE O/P EST LOW 20 MIN: CPT | Performed by: FAMILY MEDICINE

## 2019-10-18 PROCEDURE — 88175 CYTOPATH C/V AUTO FLUID REDO: CPT | Performed by: FAMILY MEDICINE

## 2019-10-18 PROCEDURE — 99396 PREV VISIT EST AGE 40-64: CPT | Performed by: FAMILY MEDICINE

## 2019-10-18 PROCEDURE — 87624 HPV HI-RISK TYP POOLED RSLT: CPT | Performed by: FAMILY MEDICINE

## 2019-10-18 RX ORDER — ROSUVASTATIN CALCIUM 5 MG/1
TABLET, COATED ORAL
Qty: 90 TABLET | Refills: 0 | Status: SHIPPED | OUTPATIENT
Start: 2019-10-18 | End: 2019-10-18 | Stop reason: CLARIF

## 2019-10-18 RX ORDER — ROSUVASTATIN CALCIUM 5 MG/1
TABLET, COATED ORAL
Qty: 90 TABLET | Refills: 0 | COMMUNITY
Start: 2019-10-18 | End: 2020-04-14

## 2019-10-18 RX ORDER — ROSUVASTATIN CALCIUM 5 MG/1
TABLET, COATED ORAL
Qty: 90 TABLET | Refills: 0 | Status: SHIPPED | OUTPATIENT
Start: 2019-10-18 | End: 2019-10-18

## 2019-10-18 NOTE — PROCEDURES
HPI:   Patient presents with:  Derm Problem: botox       Justin Richardson is a 62year old female who presents for cosmetic facial BOTOX injections for normal skin aging effects. She is without other complaints and concerns. See ROS below.     Tamir Height as of an earlier encounter on 10/18/19: 66\". Weight as of an earlier encounter on 10/18/19: 158 lb (71.7 kg).   GENERAL: WDWN female in no apparent distress  SKIN: no suspicious lesions,  skin color wnl, Normal signs of skin aging  HEENT: atrauma

## 2019-10-19 NOTE — H&P
HPI:   Patient presents with:  Physical  Gyn Exam  Thyroid Problem  Hyperlipidemia  Pre-diabetes      Zana Ford is a 62year old female who presents for a complete physical and pap/gyne exam.     Patient has new complaints of:  Hypothyroidism rec Patient Active Problem List:     Plantar fasciitis     Hypothyroidism     Achilles tendinitis rt global thru 8/18/14     Vitamin D deficiency     Pre-diabetes     Abnormal Heart Score CT     Coronary artery calcification     Pure hypercholesterolemi Component Value Date/Time    WBC 4.1 02/01/2019 08:49 AM    HGB 13.2 02/01/2019 08:49 AM     02/01/2019 08:49 AM      Lab Results   Component Value Date/Time     (H) 09/12/2019 08:53 AM     09/12/2019 08:53 AM    K 4.1 09/12/2019 08:5 suspicious lesions, color wnl  HEENT: atraumatic, normocephalic, nose and throat are clear;dentition good  EARS: canals clear, TM wnl B, EYES: PERRLA, EOMI, conjunctiva are clear, no discharge; no nystagmus  NECK: supple, no LAD, no TM, no carotid bruits o if WNL or as recommended by GI, last 11/28/2018  · Immunizations: Patient declined flu vaccine, patient will check coverage for Pneumovax and Shingrix coverage with her insurance, okay to get at pharmacy if it would be self-pay  · We will follow-up pending complaints  Check UA: Small blood (chronic finding per patient),  no signs of infection otherwise, observe for now, follow-up if symptoms worsen/next office visit      The patient indicates understanding of the above recommendations and agrees to the above

## 2019-10-23 ENCOUNTER — TELEPHONE (OUTPATIENT)
Dept: FAMILY MEDICINE CLINIC | Facility: CLINIC | Age: 59
End: 2019-10-23

## 2019-10-23 RX ORDER — NITROFURANTOIN MACROCRYSTALS 100 MG/1
100 CAPSULE ORAL 2 TIMES DAILY
Qty: 14 CAPSULE | Refills: 0 | Status: SHIPPED | OUTPATIENT
Start: 2019-10-23

## 2019-10-24 NOTE — TELEPHONE ENCOUNTER
Age by patient, urine pink, complains of dysuria as well, x1 day  No fever, no chills, no back pain  Recommend over-the-counter Uristat as needed, sent in prescription for Macrobid twice daily for 5 to 7 days, follow-up if no improvement in the next 2 days

## 2019-11-21 DIAGNOSIS — E03.8 OTHER SPECIFIED HYPOTHYROIDISM: ICD-10-CM

## 2019-11-21 DIAGNOSIS — R73.03 PRE-DIABETES: ICD-10-CM

## 2019-11-21 RX ORDER — LEVOTHYROXINE SODIUM 0.1 MG/1
TABLET ORAL
Qty: 90 TABLET | Refills: 0 | Status: SHIPPED | OUTPATIENT
Start: 2019-11-21 | End: 2020-03-06

## 2019-11-21 NOTE — TELEPHONE ENCOUNTER
Rx's pass protocol - rx refilled.   DM; A1C in past 6 m, last A1C < 7.5, microalb in past 15 m, apt in past 6 m  Thyroid; TSH in past 15 m, last TSH btw 0.350-5.500IU, apt in past 12 m

## 2019-12-18 DIAGNOSIS — R73.03 PRE-DIABETES: ICD-10-CM

## 2020-03-04 DIAGNOSIS — E03.8 OTHER SPECIFIED HYPOTHYROIDISM: ICD-10-CM

## 2020-03-04 NOTE — TELEPHONE ENCOUNTER
Patient due for f/u in April per Dr. Bailey Heidy last note. Please schedule her, then we can refill medication. Thanks!

## 2020-03-06 RX ORDER — LEVOTHYROXINE SODIUM 0.1 MG/1
TABLET ORAL
Qty: 90 TABLET | Refills: 0 | Status: SHIPPED | OUTPATIENT
Start: 2020-03-06 | End: 2020-06-15

## 2020-03-31 ENCOUNTER — TELEPHONE (OUTPATIENT)
Dept: FAMILY MEDICINE CLINIC | Facility: CLINIC | Age: 60
End: 2020-03-31

## 2020-03-31 NOTE — TELEPHONE ENCOUNTER
Received copy of mammogram report dated 4/11/2019; record of results under Care Everywhere; completed at [address].

## 2020-04-14 ENCOUNTER — TELEPHONE (OUTPATIENT)
Dept: FAMILY MEDICINE CLINIC | Facility: CLINIC | Age: 60
End: 2020-04-14

## 2020-04-14 ENCOUNTER — VIRTUAL PHONE E/M (OUTPATIENT)
Dept: FAMILY MEDICINE CLINIC | Facility: CLINIC | Age: 60
End: 2020-04-14
Payer: COMMERCIAL

## 2020-04-14 DIAGNOSIS — R73.03 PRE-DIABETES: ICD-10-CM

## 2020-04-14 DIAGNOSIS — E03.8 OTHER SPECIFIED HYPOTHYROIDISM: Primary | ICD-10-CM

## 2020-04-14 DIAGNOSIS — N95.2 POST-MENOPAUSAL ATROPHIC VAGINITIS: ICD-10-CM

## 2020-04-14 DIAGNOSIS — E78.00 PURE HYPERCHOLESTEROLEMIA: ICD-10-CM

## 2020-04-14 PROCEDURE — 99214 OFFICE O/P EST MOD 30 MIN: CPT | Performed by: FAMILY MEDICINE

## 2020-04-14 RX ORDER — ESTRADIOL 0.1 MG/G
CREAM VAGINAL
Qty: 1 TUBE | Refills: 2 | Status: SHIPPED | OUTPATIENT
Start: 2020-04-14

## 2020-04-14 RX ORDER — ROSUVASTATIN CALCIUM 5 MG/1
TABLET, COATED ORAL
Qty: 90 TABLET | Refills: 0 | Status: SHIPPED | OUTPATIENT
Start: 2020-04-14 | End: 2020-09-23

## 2020-04-14 NOTE — PROGRESS NOTES
Virtual Telephone Check-In    Sunil Vásquez verbally consents to a Virtual/Telephone Check-In visit on 04/14/20. Patient understands and accepts financial responsibility for any deductible, co-insurance and/or co-pays associated with this service. TAKE 1 TABLET BY MOUTH RIGHT with BREAKFAST AND DINNER 180 tablet 0   • Rosuvastatin Calcium 5 MG Oral Tab 1/2 tab po qd 90 tablet 0   • Estradiol 0.1 MG/GM Vaginal Cream Apply 1 gm vaginally every other day for 3 weeks, may increase to every day after 3 w abdominal pain.  No N/V/D/C  :  No dysuria  MS:  No change  NEURO: No new complaints  PSYCH:  No significant mood concerns, mild situational anxiety     EXAM:   GENERAL: Alert and Oriented x 3, able to speak and communicate clearly and in full sentences & Refills for this Visit:  Requested Prescriptions     Signed Prescriptions Disp Refills   • metFORMIN HCl 500 MG Oral Tab 180 tablet 0     Sig: TAKE 1 TABLET BY MOUTH RIGHT with BREAKFAST AND DINNER   • Rosuvastatin Calcium 5 MG Oral Tab 90 tablet 0     S

## 2020-04-24 DIAGNOSIS — E55.9 VITAMIN D DEFICIENCY: ICD-10-CM

## 2020-04-24 DIAGNOSIS — R73.03 PRE-DIABETES: Primary | ICD-10-CM

## 2020-04-24 DIAGNOSIS — E78.00 PURE HYPERCHOLESTEROLEMIA: ICD-10-CM

## 2020-04-24 DIAGNOSIS — E03.8 OTHER SPECIFIED HYPOTHYROIDISM: ICD-10-CM

## 2020-06-15 DIAGNOSIS — E03.8 OTHER SPECIFIED HYPOTHYROIDISM: ICD-10-CM

## 2020-06-15 RX ORDER — LEVOTHYROXINE SODIUM 0.1 MG/1
TABLET ORAL
Qty: 90 TABLET | Refills: 0 | Status: SHIPPED | OUTPATIENT
Start: 2020-06-15 | End: 2020-09-22

## 2020-06-15 NOTE — TELEPHONE ENCOUNTER
A refill request was received for:  Requested Prescriptions     Pending Prescriptions Disp Refills   • LEVOTHYROXINE SODIUM 100 MCG Oral Tab [Pharmacy Med Name: LEVOTHYROXINE 100 MCG TABLET] 90 tablet 0     Sig: TAKE 1 TABLET BY MOUTH EVERY DAY     Last re

## 2020-07-06 DIAGNOSIS — R73.03 PRE-DIABETES: ICD-10-CM

## 2020-07-07 NOTE — TELEPHONE ENCOUNTER
A refill request was received for:  Requested Prescriptions     Pending Prescriptions Disp Refills   • METFORMIN  MG Oral Tab [Pharmacy Med Name: METFORMIN  MG TABLET] 180 tablet 0     Sig: TAKE 1 TABLET BY MOUTH RIGHT WITH BREAKFAST AND Cherrie Jennings

## 2020-08-19 LAB
ALBUMIN/GLOBULIN RATIO: 1.4 (CALC) (ref 1–2.5)
ALBUMIN: 4.2 G/DL (ref 3.6–5.1)
ALKALINE PHOSPHATASE: 60 U/L (ref 37–153)
ALT: 12 U/L (ref 6–29)
AST: 19 U/L (ref 10–35)
BILIRUBIN, TOTAL: 0.4 MG/DL (ref 0.2–1.2)
BUN: 14 MG/DL (ref 7–25)
CALCIUM: 9.2 MG/DL (ref 8.6–10.4)
CARBON DIOXIDE: 27 MMOL/L (ref 20–32)
CHLORIDE: 104 MMOL/L (ref 98–110)
CHOL/HDLC RATIO: 2.6 (CALC)
CHOLESTEROL, TOTAL: 154 MG/DL
CREATININE: 0.8 MG/DL (ref 0.5–0.99)
EGFR IF AFRICN AM: 92 ML/MIN/1.73M2
EGFR IF NONAFRICN AM: 79 ML/MIN/1.73M2
GLOBULIN: 2.9 G/DL (CALC) (ref 1.9–3.7)
GLUCOSE: 102 MG/DL (ref 65–99)
HDL CHOLESTEROL: 60 MG/DL
HEMOGLOBIN A1C: 5.7 % OF TOTAL HGB
LDL-CHOLESTEROL: 76 MG/DL (CALC)
NON-HDL CHOLESTEROL: 94 MG/DL (CALC)
POTASSIUM: 4.4 MMOL/L (ref 3.5–5.3)
PROTEIN, TOTAL: 7.1 G/DL (ref 6.1–8.1)
SODIUM: 141 MMOL/L (ref 135–146)
T4, FREE: 1.4 NG/DL (ref 0.8–1.8)
TRIGLYCERIDES: 99 MG/DL
TSH: 0.52 MIU/L (ref 0.4–4.5)
VITAMIN D, 25-OH, TOTAL: 34 NG/ML (ref 30–100)

## 2020-08-20 NOTE — PROGRESS NOTES
Delilah Rogers,    Below are the results of your recently performed labs/tests: All results are within NORMAL limits for you:    Please: Continue your present medication(s). Recheck as below:   In 6 months  Follow up:  Pending the results then

## 2020-09-22 DIAGNOSIS — E78.00 PURE HYPERCHOLESTEROLEMIA: ICD-10-CM

## 2020-09-22 DIAGNOSIS — E03.8 OTHER SPECIFIED HYPOTHYROIDISM: ICD-10-CM

## 2020-09-22 DIAGNOSIS — R73.03 PRE-DIABETES: ICD-10-CM

## 2020-09-22 RX ORDER — LEVOTHYROXINE SODIUM 0.1 MG/1
100 TABLET ORAL DAILY
Qty: 90 TABLET | Refills: 0 | Status: SHIPPED | OUTPATIENT
Start: 2020-09-22 | End: 2020-12-16

## 2020-09-22 NOTE — TELEPHONE ENCOUNTER
A refill request was received for:  Requested Prescriptions     Pending Prescriptions Disp Refills   • Levothyroxine Sodium 100 MCG Oral Tab 90 tablet 0     Sig: Take 1 tablet (100 mcg total) by mouth daily.      Last refill date: 06/15/2020  Qty:90  Last o

## 2020-09-23 RX ORDER — ROSUVASTATIN CALCIUM 5 MG/1
TABLET, COATED ORAL
Qty: 90 TABLET | Refills: 0 | Status: SHIPPED | OUTPATIENT
Start: 2020-09-23 | End: 2021-01-26

## 2020-09-23 NOTE — TELEPHONE ENCOUNTER
A refill request was received for:  Requested Prescriptions     Pending Prescriptions Disp Refills   • metFORMIN HCl 500 MG Oral Tab 180 tablet 0     Sig: Take 1 tablet (500 mg total) by mouth 2 (two) times daily with meals.    • Rosuvastatin Calcium 5 MG O

## 2020-09-23 NOTE — TELEPHONE ENCOUNTER
Call pt-LHK I sent in RF(s) and remind that she is due for follow up with me: In 1 month  I am booking out 4-6 weeks so make appointment now if able. Thanks!

## 2020-09-23 NOTE — TELEPHONE ENCOUNTER
Please send refill for Metformin and Rosuvastatin to pharmacy in [place] as well. Patient informed Dr Obdulia Sands that she moved.

## 2020-09-29 DIAGNOSIS — R73.03 PRE-DIABETES: ICD-10-CM

## 2020-12-16 DIAGNOSIS — E03.8 OTHER SPECIFIED HYPOTHYROIDISM: ICD-10-CM

## 2020-12-16 DIAGNOSIS — R73.03 PRE-DIABETES: ICD-10-CM

## 2020-12-16 RX ORDER — LEVOTHYROXINE SODIUM 0.1 MG/1
TABLET ORAL
Qty: 90 TABLET | Refills: 0 | Status: SHIPPED | OUTPATIENT
Start: 2020-12-16

## 2020-12-16 NOTE — TELEPHONE ENCOUNTER
A refill request was received for:  Requested Prescriptions     Pending Prescriptions Disp Refills   • LEVOTHYROXINE SODIUM 100 MCG Oral Tab [Pharmacy Med Name: LEVOTHYROXINE 100 MCG TABLET] 90 tablet 0     Sig: TAKE 1 TABLET BY MOUTH EVERY DAY   • [id number] [facility]

## 2021-01-24 DIAGNOSIS — E78.00 PURE HYPERCHOLESTEROLEMIA: ICD-10-CM

## 2021-01-24 DIAGNOSIS — R73.03 PRE-DIABETES: ICD-10-CM

## 2021-01-25 NOTE — TELEPHONE ENCOUNTER
Patient was due for follow-up visit in October 2020. Please schedule her prior to us refilling this medicine. Thanks!

## 2021-01-26 RX ORDER — ROSUVASTATIN CALCIUM 5 MG/1
TABLET, COATED ORAL
Qty: 30 TABLET | Refills: 0 | Status: SHIPPED | OUTPATIENT
Start: 2021-01-26

## 2021-01-26 NOTE — TELEPHONE ENCOUNTER
Spoke to pt, she has moved to [place]. She is in the process of finding a new PCP. Pt wants to know if you can refill this and her metformin possibly.     A refill request was received for:  Requested Prescriptions     Pending Prescriptions Disp Refills   •

## 2021-03-14 DIAGNOSIS — E03.8 OTHER SPECIFIED HYPOTHYROIDISM: ICD-10-CM

## 2021-03-15 RX ORDER — LEVOTHYROXINE SODIUM 0.1 MG/1
TABLET ORAL
Qty: 90 TABLET | Refills: 0 | OUTPATIENT
Start: 2021-03-15

## 2021-03-15 NOTE — TELEPHONE ENCOUNTER
Pt informed office she has identified a new PCP in [place] and does not need this refilled at this time. Pt will contact [facility] as well to stop the autorefill requests.

## 2021-03-20 DIAGNOSIS — E78.00 PURE HYPERCHOLESTEROLEMIA: ICD-10-CM

## 2021-03-22 RX ORDER — ROSUVASTATIN CALCIUM 5 MG/1
TABLET, COATED ORAL
Qty: 30 TABLET | Refills: 0 | OUTPATIENT
Start: 2021-03-22

## 2021-03-22 NOTE — TELEPHONE ENCOUNTER
A refill request was received for:  Requested Prescriptions     Pending Prescriptions Disp Refills   • ROSUVASTATIN CALCIUM 5 MG Oral Tab [Pharmacy Med Name: ROSUVASTATIN CALCIUM 5 MG TAB] 30 tablet 0     Sig: TAKE 1/2 TABLET BY MOUTH EVERY DAY     Last re

## 2021-04-03 DIAGNOSIS — E78.00 PURE HYPERCHOLESTEROLEMIA: ICD-10-CM

## 2021-04-05 RX ORDER — ROSUVASTATIN CALCIUM 5 MG/1
TABLET, COATED ORAL
Qty: 30 TABLET | Refills: 0 | OUTPATIENT
Start: 2021-04-05

## 2021-04-05 NOTE — TELEPHONE ENCOUNTER
A refill request was received for:  Requested Prescriptions     Pending Prescriptions Disp Refills   • ROSUVASTATIN CALCIUM 5 MG Oral Tab [Pharmacy Med Name: ROSUVASTATIN CALCIUM 5 MG TAB] 30 tablet 0     Sig: TAKE 1/2 TABLET BY MOUTH EVERY DAY

## 2022-12-09 ENCOUNTER — APPOINTMENT (RX ONLY)
Dept: URBAN - METROPOLITAN AREA CLINIC 134 | Facility: CLINIC | Age: 62
Setting detail: DERMATOLOGY
End: 2022-12-09

## 2022-12-09 DIAGNOSIS — Z71.89 OTHER SPECIFIED COUNSELING: ICD-10-CM

## 2022-12-09 DIAGNOSIS — L57.0 ACTINIC KERATOSIS: ICD-10-CM

## 2022-12-09 DIAGNOSIS — R20.2 PARESTHESIA OF SKIN: ICD-10-CM

## 2022-12-09 DIAGNOSIS — L81.4 OTHER MELANIN HYPERPIGMENTATION: ICD-10-CM

## 2022-12-09 DIAGNOSIS — L82.1 OTHER SEBORRHEIC KERATOSIS: ICD-10-CM

## 2022-12-09 DIAGNOSIS — D22 MELANOCYTIC NEVI: ICD-10-CM

## 2022-12-09 PROBLEM — D22.5 MELANOCYTIC NEVI OF TRUNK: Status: ACTIVE | Noted: 2022-12-09

## 2022-12-09 PROCEDURE — ? COUNSELING

## 2022-12-09 PROCEDURE — ? LIQUID NITROGEN

## 2022-12-09 PROCEDURE — 99203 OFFICE O/P NEW LOW 30 MIN: CPT | Mod: 25

## 2022-12-09 PROCEDURE — 17003 DESTRUCT PREMALG LES 2-14: CPT

## 2022-12-09 PROCEDURE — ? TREATMENT REGIMEN

## 2022-12-09 PROCEDURE — ? DEFER

## 2022-12-09 PROCEDURE — 17000 DESTRUCT PREMALG LESION: CPT

## 2022-12-09 ASSESSMENT — LOCATION DETAILED DESCRIPTION DERM
LOCATION DETAILED: RIGHT MID-UPPER BACK
LOCATION DETAILED: UPPER STERNUM
LOCATION DETAILED: RIGHT SUPERIOR MEDIAL UPPER BACK
LOCATION DETAILED: RIGHT FOREHEAD
LOCATION DETAILED: LEFT SUPERIOR MEDIAL UPPER BACK
LOCATION DETAILED: RIGHT MEDIAL SUPERIOR CHEST
LOCATION DETAILED: LEFT LATERAL SUPERIOR CHEST
LOCATION DETAILED: LEFT MEDIAL SUPERIOR CHEST

## 2022-12-09 ASSESSMENT — LOCATION SIMPLE DESCRIPTION DERM
LOCATION SIMPLE: RIGHT FOREHEAD
LOCATION SIMPLE: CHEST
LOCATION SIMPLE: LEFT UPPER BACK
LOCATION SIMPLE: RIGHT UPPER BACK

## 2022-12-09 ASSESSMENT — TOTAL NUMBER OF LESIONS: # OF LESIONS?: 4

## 2022-12-09 ASSESSMENT — PAIN INTENSITY VAS: HOW INTENSE IS YOUR PAIN 0 BEING NO PAIN, 10 BEING THE MOST SEVERE PAIN POSSIBLE?: NO PAIN

## 2022-12-09 ASSESSMENT — LOCATION ZONE DERM
LOCATION ZONE: FACE
LOCATION ZONE: TRUNK

## 2022-12-09 NOTE — PROCEDURE: LIQUID NITROGEN
Consent: The patient's consent was obtained including but not limited to risks of crusting, scabbing, blistering, scarring, darker or lighter pigmentary change, recurrence, incomplete removal and infection.
Show Aperture Variable?: Yes
Render Note In Bullet Format When Appropriate: No
Detail Level: Detailed
Number Of Freeze-Thaw Cycles: 2 freeze-thaw cycles
Post-Care Instructions: I reviewed with the patient in detail post-care instructions. Patient is to wear sunprotection, and avoid picking at any of the treated lesions. Pt may apply Vaseline to crusted or scabbing areas.
Duration Of Freeze Thaw-Cycle (Seconds): 5
Application Tool (Optional): Liquid Nitrogen Sprayer

## 2022-12-09 NOTE — HPI: FULL BODY SKIN EXAMINATION
How Severe Are Your Spot(S)?: mild
What Is The Reason For Today's Visit?: Initial Full Body Skin Examination
What Is The Reason For Today's Visit? (Being Monitored For X): concerning skin lesions on a periodic basis

## 2022-12-09 NOTE — PROCEDURE: MIPS QUALITY
Quality 130: Documentation Of Current Medications In The Medical Record: Current Medications Documented
Detail Level: Detailed
Quality 110: Preventive Care And Screening: Influenza Immunization: Influenza Immunization Administered during Influenza season
Additional Notes: Covid-19 vaccine: yes

## 2023-09-25 NOTE — TELEPHONE ENCOUNTER
Rx passes protocol - rx refilled.   Lipid; ALT < 80, ALT and lipid in past 12 m, apt in past 12 m
25-Sep-2023 23:51

## (undated) NOTE — LETTER
Joelle Bass [address] [facility]  [place], [address] [place] [address]    Consent for Operation  Date: __________________                                Time: _______________    1.  I authorize the performance upon Kenyetta Valenzuela the following operation:  Procedu videotape. The [facility] will not be responsible for storage or maintenance of this tape. 7. For the purpose of advancing medical education, I consent to the admittance of observers to the Operating Room.   8. I authorize the use of any specimen, organs, ti When the patient is a minor or mentally incompetent to give consent:  Signature of person authorized to consent for patient: ___________________________   Relationship to patient: ____________________________________________________    Signature of Witness these medicines may increase my risk of anesthetic complications. iv. If I am allergic to anything or have had a reaction to anesthesia before. 3. I understand how the anesthesia medicine will help me (benefits).   4. I understand that with any type of an patient’s representative) and answered their questions. The patient or their representative has agreed to have anesthesia services.     _____________________________________________________________________________  Witness        Date   Time  I have agata